# Patient Record
Sex: FEMALE | Race: ASIAN | Employment: UNEMPLOYED | URBAN - METROPOLITAN AREA
[De-identification: names, ages, dates, MRNs, and addresses within clinical notes are randomized per-mention and may not be internally consistent; named-entity substitution may affect disease eponyms.]

---

## 2019-05-24 ENCOUNTER — HOSPITAL ENCOUNTER (EMERGENCY)
Facility: CLINIC | Age: 49
Discharge: HOME | End: 2019-05-24
Attending: EMERGENCY MEDICINE | Admitting: EMERGENCY MEDICINE
Payer: MEDICAID

## 2019-05-24 VITALS
SYSTOLIC BLOOD PRESSURE: 123 MMHG | DIASTOLIC BLOOD PRESSURE: 86 MMHG | OXYGEN SATURATION: 96 % | TEMPERATURE: 98.3 F | HEART RATE: 76 BPM

## 2019-05-24 DIAGNOSIS — K13.79 ACUTE PAIN OF MOUTH: ICD-10-CM

## 2019-05-24 PROCEDURE — 25000132 ZZH RX MED GY IP 250 OP 250 PS 637: Performed by: EMERGENCY MEDICINE

## 2019-05-24 PROCEDURE — 99283 EMERGENCY DEPT VISIT LOW MDM: CPT

## 2019-05-24 RX ORDER — DIPHENHYDRAMINE HYDROCHLORIDE AND LIDOCAINE HYDROCHLORIDE AND ALUMINUM HYDROXIDE AND MAGNESIUM HYDRO
10 KIT EVERY 6 HOURS PRN
Status: DISCONTINUED | OUTPATIENT
Start: 2019-05-24 | End: 2019-05-24 | Stop reason: HOSPADM

## 2019-05-24 RX ADMIN — DIPHENHYDRAMINE HYDROCHLORIDE AND LIDOCAINE HYDROCHLORIDE AND ALUMINUM HYDROXIDE AND MAGNESIUM HYDRO 10 ML: KIT at 19:04

## 2019-05-24 ASSESSMENT — ENCOUNTER SYMPTOMS
FEVER: 0
TROUBLE SWALLOWING: 0

## 2019-05-24 NOTE — ED PROVIDER NOTES
History     Chief Complaint:  Allergic Reaction    The history is provided by the patient.      Phoebe Girard is a 48 year old female who presents with an allergic reaction. The patient reports that she was eating chicken with sauce tonight about a half ago and her tongue and the roof of her mouth started swelling and burning. She denies a history of food allergies. She denies any trouble breathing or swallowing. The patient states that the swallowing and burning sensation has gotten better. She denies any hives.     Allergies:  No known drug allergies.    Medications:    Depakote   Vitamin B3  Risperdal   One other medication that she is not sure of the name of    Past Medical History:    Hallucination     Past Surgical History:    History reviewed. No pertinent past surgical history.    Family History:    History reviewed. No pertinent family history.    Social History:  Presents to ED alone  PCP: Physician No Ref-Primary     Review of Systems   Constitutional: Negative for fever.   HENT: Negative for trouble swallowing.         Tongue swelling and burning sensation   Respiratory:        No trouble breathing   Skin: Negative for rash.   All other systems reviewed and are negative.    Physical Exam   First Vitals:  Patient Vitals for the past 24 hrs:   BP Temp Temp src Pulse SpO2   05/24/19 1833 (!) 140/92 98.3  F (36.8  C) Oral 84 96 %     Physical Exam  General: Well-nourished, appears to be resting comfortably on cart when I enter the room  Eyes: PERRL, conjunctivae pink no scleral icterus or conjunctival injection  ENT:  Moist mucus membranes, posterior oropharynx clear without erythema or exudates.  Uvula midline without edema, no tongue/lip/oropharngeal swelling.  Mallampati I.  No stridor or wheezing.  Patient points to the right hard and soft palate as location of pain.  I do not appreciate any ulcers, hives, swelling or other abnormalities  Respiratory:  Lungs clear to auscultation bilaterally, no  crackles/rubs/wheezes.  Good air movement  CV: Normal rate and rhythm, no murmurs/rubs/gallops  GI:  Abdomen soft and non-distended.  Normoactive BS.  No tenderness, guarding or rebound  Skin: Warm, dry.  No rashes or petechiae. No hives.  Musculoskeletal: No peripheral edema or calf tenderness  Neuro: Alert and oriented to person/place/time  Psychiatric: Normal affect    Emergency Department Course     Interventions:  1904: Magic mouthwash 10mL swish and spit    Emergency Department Course:  6:28 PM Nursing notes and vitals reviewed.  I performed an exam of the patient as documented above.     8:17 PM Findings and plan explained to the patient. Patient discharged home with instructions regarding supportive care, medications, and reasons to return. The importance of close follow-up was reviewed.     Impression & Plan      Medical Decision Making:  Phoebe Girard is a 48 year old female who comes with mouth pain and burning after eating a new type of food tonight.  I do not appreciate anything abnormal on physical examination.  She does not appear to be having an allergic reaction.  I do not see any rashes to suggest drug rash or a drug reaction secondary to Depakote.  I do not know what is the cause of her pain.  We did treat with Magic mouthwash and she had resolution and so I suspect that the superficial irritation.  At this point I think she is safe for discharge home but cautioned her verbally and in writing that should she develop any rash, new sores, difficulty in swallowing or breathing or become worse in any way that she should return to the emergency department.  She went home with the bottle Magic mouthwash.  We faxed over an order to her group home to allow her to take this.     Diagnosis:    ICD-10-CM    1. Acute pain of mouth K13.79        Disposition:  discharged to home    I, Bradley Aasen, am serving as a scribe on 5/24/2019 at 6:28 PM to personally document services performed by Shelly Lira MD based  on my observations and the provider's statements to me.      Shelly Lira MD  05/24/19 2054

## 2019-05-24 NOTE — ED AVS SNAPSHOT
Emergency Department  [address] [place] [id number]  Phone:  [phone]  Fax:  [phone]                                    Phoebe Girard   MRN: [id number]    Department:   Emergency Department   Date of Visit:  5/24/2019           After Visit Summary Signature Page    I have received my discharge instructions, and my questions have been answered. I have discussed any challenges I see with this plan with the nurse or doctor.    ..........................................................................................................................................  Patient/Patient Representative Signature      ..........................................................................................................................................  Patient Representative Print Name and Relationship to Patient    ..................................................               ................................................  Date                                   Time    ..........................................................................................................................................  Reviewed by Signature/Title    ...................................................              ..............................................  Date                                               Time          22EPIC Rev 08/18

## 2019-05-24 NOTE — ED TRIAGE NOTES
Patient ate chicken with sauce and states that after eating she started to have tongue swelling. Patient does not have any known allergies and is unsure if she is having a reaction to something

## 2019-05-25 NOTE — DISCHARGE INSTRUCTIONS
*You may resume diet and activities as tolerated.  *Magic mouthwash 10 ml swish and spit every six hours as needed for pain.  *Follow-up with your doctor for a recheck in 2-3 days.   *Return if you develop a rash, more mouth sores, problems with tongue swelling, problems breathing, faint or feel like you will faint or become worse in any way.    Discharge Instructions  Allergic Reaction    An allergic reaction can result in a rash, itching, swelling, watery eyes, or a runny nose. A serious reaction can cause swelling of your mouth or throat, or difficulty breathing (wheezing). The most serious allergy is called anaphylaxis, and can be life-threatening. Many allergies result in hives, also called urticaria.       An allergy happens when the body?s natural defense system (immune system) overreacts to something. The thing that triggers your allergic reaction is called an allergen. The first time you are exposed to your allergen, you may not have any reaction, but the body makes a protein called an antibody. The antibody lets the body recognize and remember the allergen.  Every time you are exposed to your allergen you get more antibody and your reaction can be more severe.      Generally, every Emergency Department visit should have a follow-up clinic visit with either a primary or a specialty clinic/provider. Please follow-up as instructed by your emergency provider today.    Call 911 if you have:  Swelling of the lips, tongue or throat.  Hoarse voice, drooling or trouble breathing.  Chest pain or shortness of breath.  Fainting or unconsciousness.    What can I do to help myself?  If you know what caused your allergy, do not touch it, throw any of it away, and tell others not to have it around you. Wear a medical alert bracelet with a name of your allergen on it.  If you do not know what you are allergic to, keep a journal of everything that you are exposed to (foods, soaps, medicines, etc.). Take this with you when  you follow up with your primary provider or specialist (Allergist). This may help determine what is causing the allergic reaction.  Take any medicines that are prescribed.  Antihistamines can decrease rash or itching. You may use Benadryl  (diphenhydramine) for rash or itching according to package directions, or use a prescription antihistamine as recommended by your provider.  For significant allergic reactions, you may have been given a prescription for an epinephrine (adrenaline) auto injector. Carry this with you at all times! Use it if you are having any symptoms of anaphylaxis.  Do not be afraid to use it. Return to the Emergency Department if you use your auto injector, call 911 if it does not resolve the symptoms. It is only meant to buy time until you can get to the Emergency Department!  If you were given a prescription for medicine here today, be sure to read all of the information (including the package insert) that comes with your prescription.  This will include important information about the medicine, its side effects, and any warnings that you need to know about.  The pharmacist who fills the prescription can provide more information and answer questions you may have about the medicine.  If you have questions or concerns that the pharmacist cannot address, please call or return to the Emergency Department.   Remember that you can always come back to the Emergency Department if you are not able to see your regular provider in the amount of time listed above, if you get any new symptoms, or if there is anything that worries you.

## 2024-07-14 ENCOUNTER — OFFICE VISIT (OUTPATIENT)
Dept: FAMILY MEDICINE | Facility: CLINIC | Age: 54
End: 2024-07-14
Payer: MEDICAID

## 2024-07-14 VITALS
DIASTOLIC BLOOD PRESSURE: 85 MMHG | HEART RATE: 85 BPM | TEMPERATURE: 97.9 F | SYSTOLIC BLOOD PRESSURE: 125 MMHG | OXYGEN SATURATION: 98 %

## 2024-07-14 DIAGNOSIS — S50.862A INSECT BITE OF LEFT FOREARM WITH LOCAL REACTION, INITIAL ENCOUNTER: Primary | ICD-10-CM
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
## 2024-07-14 PROCEDURE — 99203 OFFICE O/P NEW LOW 30 MIN: CPT

## 2024-07-14 RX ORDER — HYDROCORTISONE 2.5 %
CREAM (GRAM) TOPICAL 2 TIMES DAILY
Qty: 30 G | Refills: 0 | Status: SHIPPED | OUTPATIENT
Start: 2024-07-14

## 2024-07-14 RX ORDER — CETIRIZINE HYDROCHLORIDE 10 MG/1
10 TABLET ORAL 2 TIMES DAILY
COMMUNITY
Start: 2024-07-14

## 2024-07-14 ASSESSMENT — ENCOUNTER SYMPTOMS: WOUND: 1

## 2024-07-14 NOTE — PATIENT INSTRUCTIONS
Increase your histamine dosage by taking zyrtec (cetrizine)  2 times as much as the recommended dosage. Usually this is one tablet once a day but you can take 1 tablet in the AM and 1 tablet in the evening. Sometimes oatmeal baths can soothe the area and hydrocortisone cream will help. If having a hard time sleeping, try oral Benadryl at bedtime. Some of the side effects of oral benadryl include dry mouth, diplopia, blurred vision, urinary retention.   Continue icing the area to help with the swelling.

## 2024-07-14 NOTE — PROGRESS NOTES
Assessment & Plan          1. Insect bite of left forearm with local reaction, initial encounter    - cetirizine (ZYRTEC) 10 MG tablet; Take 1 tablet (10 mg) by mouth 2 times daily Until symptoms improve  - hydrocortisone 2.5 % cream; Apply topically 2 times daily Apply sparingly on the insect bite area until symptoms improve. Use up to 7 days.  Dispense: 30 g; Refill: 0    Patient Instructions   Increase your histamine dosage by taking zyrtec (cetrizine)  2 times as much as the recommended dosage. Usually this is one tablet once a day but you can take 1 tablet in the AM and 1 tablet in the evening. Sometimes oatmeal baths can soothe the area and hydrocortisone cream will help. If having a hard time sleeping, try oral Benadryl at bedtime. Some of the side effects of oral benadryl include dry mouth, diplopia, blurred vision, urinary retention.   Continue icing the area to help with the swelling.      Return if symptoms worsen or fail to improve, for Follow up.    At the end of the encounter, I discussed results, diagnosis, medications. Discussed red flags for immediate return to clinic/ER, as well as indications for follow up if no improvement. Patient understood and agreed to plan. Patient was stable for discharge.    Namita Sandhu is a 53 year old female who presents to clinic today for the following health issues:  Chief Complaint   Patient presents with    Urgent Care     Poss insect bite woke this morning left forearm redness, raised warm to the touch itching      HPI    Patient reports insect bite on the left forearm which she noticed this morning. She notes the area is red, itchy and warm to touch. She has tried icing the are which helps. No fever or chills.     Review of Systems   Skin:  Positive for rash and wound.   All other systems reviewed and are negative.      Problem List:  There are no relevant problems documented for this patient.      No past medical history on file.    Social History      Tobacco Use    Smoking status: Never    Smokeless tobacco: Never   Substance Use Topics    Alcohol use: Not on file           Objective    /85   Pulse 85   Temp 97.9  F (36.6  C) (Tympanic)   LMP  (LMP Unknown)   SpO2 98%   Physical Exam  Constitutional:       Appearance: Normal appearance.   HENT:      Head: Normocephalic.   Cardiovascular:      Rate and Rhythm: Normal rate and regular rhythm.   Pulmonary:      Effort: Pulmonary effort is normal.      Breath sounds: Normal breath sounds.   Musculoskeletal:        Arms:       Cervical back: Normal range of motion and neck supple.      Comments: Insect bite wound on the left forearm  Erythema and mild swelling surrounding the bite wound    Lymphadenopathy:      Head:      Right side of head: No submental, submandibular or tonsillar adenopathy.      Left side of head: No submental, submandibular or tonsillar adenopathy.   Skin:     General: Skin is warm and dry.      Findings: Erythema and rash present.   Neurological:      Mental Status: She is alert and oriented to person, place, and time.              Stephanie Scruggs PA-C